# Patient Record
Sex: FEMALE | Race: WHITE | NOT HISPANIC OR LATINO | URBAN - METROPOLITAN AREA
[De-identification: names, ages, dates, MRNs, and addresses within clinical notes are randomized per-mention and may not be internally consistent; named-entity substitution may affect disease eponyms.]

---

## 2022-08-15 ENCOUNTER — OFFICE VISIT (OUTPATIENT)
Dept: PLASTIC SURGERY | Facility: CLINIC | Age: 57
End: 2022-08-15
Payer: COMMERCIAL

## 2022-08-15 VITALS
HEART RATE: 67 BPM | WEIGHT: 170 LBS | BODY MASS INDEX: 26.68 KG/M2 | HEIGHT: 67 IN | DIASTOLIC BLOOD PRESSURE: 84 MMHG | TEMPERATURE: 98 F | SYSTOLIC BLOOD PRESSURE: 125 MMHG

## 2022-08-15 DIAGNOSIS — Z41.1 ENCOUNTER FOR COSMETIC SURGERY: Primary | ICD-10-CM

## 2022-08-15 PROCEDURE — 3074F SYST BP LT 130 MM HG: CPT | Mod: CPTII,S$GLB,, | Performed by: SURGERY

## 2022-08-15 PROCEDURE — 3008F BODY MASS INDEX DOCD: CPT | Mod: CPTII,S$GLB,, | Performed by: SURGERY

## 2022-08-15 PROCEDURE — 99499 NO LOS: ICD-10-PCS | Mod: S$GLB,,, | Performed by: SURGERY

## 2022-08-15 PROCEDURE — 3074F PR MOST RECENT SYSTOLIC BLOOD PRESSURE < 130 MM HG: ICD-10-PCS | Mod: CPTII,S$GLB,, | Performed by: SURGERY

## 2022-08-15 PROCEDURE — 3079F DIAST BP 80-89 MM HG: CPT | Mod: CPTII,S$GLB,, | Performed by: SURGERY

## 2022-08-15 PROCEDURE — 99499 UNLISTED E&M SERVICE: CPT | Mod: S$GLB,,, | Performed by: SURGERY

## 2022-08-15 PROCEDURE — 3008F PR BODY MASS INDEX (BMI) DOCUMENTED: ICD-10-PCS | Mod: CPTII,S$GLB,, | Performed by: SURGERY

## 2022-08-15 PROCEDURE — 3079F PR MOST RECENT DIASTOLIC BLOOD PRESSURE 80-89 MM HG: ICD-10-PCS | Mod: CPTII,S$GLB,, | Performed by: SURGERY

## 2022-08-15 RX ORDER — LEVOTHYROXINE SODIUM 50 UG/1
50 TABLET ORAL EVERY MORNING
COMMUNITY
Start: 2022-08-02

## 2022-08-15 RX ORDER — FOLIC ACID 1 MG/1
1000 TABLET ORAL DAILY
COMMUNITY
Start: 2022-08-02

## 2022-08-15 NOTE — PROGRESS NOTES
"    CONSULTATION NOTE      8/15/2022    Cosmetic Surgery- Bracoiplasty, Skin removal and breast implant augmentation?    Referring Provider  Self   ?  HPI  Nikki Laurent is a 57 y.o. yo female who is here for evaluation for cosmetic surgery.     Heaviest weight  248lbs  Weight loss last 6 mo  78 lbs  Body mass index is Body mass index is 26.63 kg/m².     No plans for future pregnancy.      Bra Size  A cup, desires B-C cup    Smoking history: no    No bleeding or clotting issues.- no    Prior surgery to abdomen or breast: breast reduction/ gall bladder/appendectomy    No personal or family history of breast cancer in first degree relative. - no    Accutane use -no    Occupation -       [employer]  Past Medical History:   Diagnosis Date    H/O total hysterectomy     S/P bilateral breast reduction          PSH  Past Surgical History:   Procedure Laterality Date    APPENDECTOMY      GALLBLADDER SURGERY      REDUCTION OF BOTH BREASTS      TOTAL ABDOMINAL HYSTERECTOMY           FHX  History reviewed. No pertinent family history.       MEDICATIONS  Current Outpatient Medications   Medication Instructions    folic acid (FOLVITE) 1,000 mcg, Oral, Daily    levothyroxine (SYNTHROID) 50 mcg, Oral, Every morning         ALLERGIES   Review of patient's allergies indicates:  No Known Allergies      Social History  Social History     Socioeconomic History    Marital status: Unknown   Tobacco Use    Smoking status: Never Smoker    Smokeless tobacco: Never Used   Substance and Sexual Activity    Alcohol use: Never    Drug use: Never    Sexual activity: Yes           ROS  Negative per HPI.       Physical Exam  /84   Pulse 67   Temp 98.3 °F (36.8 °C)   Ht 5' 7" (1.702 m)   Wt 77.1 kg (170 lb)   BMI 26.63 kg/m²   Constitutional: Pt is oriented to person, place, and time.  Pt appears well-developed and well-nourished.   HENT: Normocephalic and atraumatic.   Pulmonary/Chest: Effort normal. No " respiratory distress.   Abdomen: Soft. Non-tender. No masses or distension.  Musculoskeletal: Normal range of motion. Pt exhibits no edema or deformity.   Neurological: Pt is alert and oriented to person, place, and time. No sensory deficit. Exhibits normal muscle tone.   Skin: Skin is warm. No rash noted. No erythema.     No ptosis no mass or skin change, no nipple discharge 350g breast  Periareolar scar and imf scar healed  Abdominal and lumbar lipodystrophy  Rectus diastasis  Skin pinch 3cm  No incisions  No hernias appreciated  Mons pubis with some descent        Plan  Tummy tuck with breast implant augmentation?  Stage back bra tuck with lipo and thigh skin removal  Obtain records on breast reduction  Check mammogram

## 2022-08-16 ENCOUNTER — TELEPHONE (OUTPATIENT)
Dept: PLASTIC SURGERY | Facility: CLINIC | Age: 57
End: 2022-08-16
Payer: COMMERCIAL

## 2022-08-16 NOTE — TELEPHONE ENCOUNTER
----- Message from Michelle Butcher sent at 8/16/2022 10:54 AM CDT -----  pt waiting on exam cost breakdown to be sent to [facility], discussed yesterday..[phone]

## 2022-08-16 NOTE — TELEPHONE ENCOUNTER
LVM explaining to pt that we would send the quotes via email and it could be towards the end of this week beginning of next week depending on when Dr Esposito signed her chart and sent to [facility].

## 2022-12-29 ENCOUNTER — OFFICE VISIT (OUTPATIENT)
Dept: UROLOGY | Facility: CLINIC | Age: 57
End: 2022-12-29
Payer: COMMERCIAL

## 2022-12-29 ENCOUNTER — HOSPITAL ENCOUNTER (OUTPATIENT)
Dept: RADIOLOGY | Facility: CLINIC | Age: 57
Discharge: HOME | End: 2022-12-29
Attending: NURSE PRACTITIONER
Payer: COMMERCIAL

## 2022-12-29 VITALS — RESPIRATION RATE: 18 BRPM | BODY MASS INDEX: 26.68 KG/M2 | WEIGHT: 170 LBS | HEIGHT: 67 IN

## 2022-12-29 DIAGNOSIS — R10.9 RIGHT FLANK PAIN: ICD-10-CM

## 2022-12-29 DIAGNOSIS — N39.498 OTHER URINARY INCONTINENCE: ICD-10-CM

## 2022-12-29 DIAGNOSIS — Z87.442 HISTORY OF NEPHROLITHIASIS: Primary | ICD-10-CM

## 2022-12-29 DIAGNOSIS — Z87.442 HISTORY OF NEPHROLITHIASIS: ICD-10-CM

## 2022-12-29 PROCEDURE — 1159F MED LIST DOCD IN RCRD: CPT | Mod: CPTII,S$GLB,, | Performed by: NURSE PRACTITIONER

## 2022-12-29 PROCEDURE — 3008F PR BODY MASS INDEX (BMI) DOCUMENTED: ICD-10-PCS | Mod: CPTII,S$GLB,, | Performed by: NURSE PRACTITIONER

## 2022-12-29 PROCEDURE — 99204 PR OFFICE/OUTPT VISIT, NEW, LEVL IV, 45-59 MIN: ICD-10-PCS | Mod: S$GLB,,, | Performed by: NURSE PRACTITIONER

## 2022-12-29 PROCEDURE — 74018 RADEX ABDOMEN 1 VIEW: CPT | Mod: 26,,, | Performed by: STUDENT IN AN ORGANIZED HEALTH CARE EDUCATION/TRAINING PROGRAM

## 2022-12-29 PROCEDURE — 74018 RADEX ABDOMEN 1 VIEW: CPT | Mod: TC,,, | Performed by: UROLOGY

## 2022-12-29 PROCEDURE — 99204 OFFICE O/P NEW MOD 45 MIN: CPT | Mod: S$GLB,,, | Performed by: NURSE PRACTITIONER

## 2022-12-29 PROCEDURE — 3008F BODY MASS INDEX DOCD: CPT | Mod: CPTII,S$GLB,, | Performed by: NURSE PRACTITIONER

## 2022-12-29 PROCEDURE — 1160F RVW MEDS BY RX/DR IN RCRD: CPT | Mod: CPTII,S$GLB,, | Performed by: NURSE PRACTITIONER

## 2022-12-29 PROCEDURE — 74018 XR ABDOMEN AP 1 VIEW: ICD-10-PCS | Mod: 26,,, | Performed by: STUDENT IN AN ORGANIZED HEALTH CARE EDUCATION/TRAINING PROGRAM

## 2022-12-29 PROCEDURE — 1159F PR MEDICATION LIST DOCUMENTED IN MEDICAL RECORD: ICD-10-PCS | Mod: CPTII,S$GLB,, | Performed by: NURSE PRACTITIONER

## 2022-12-29 PROCEDURE — 1160F PR REVIEW ALL MEDS BY PRESCRIBER/CLIN PHARMACIST DOCUMENTED: ICD-10-PCS | Mod: CPTII,S$GLB,, | Performed by: NURSE PRACTITIONER

## 2022-12-29 PROCEDURE — 74018 XR ABDOMEN AP 1 VIEW: ICD-10-PCS | Mod: TC,,, | Performed by: UROLOGY

## 2022-12-29 NOTE — PROGRESS NOTES
Subjective:       Patient ID: Nikki Laurent is a 57 y.o. female.    Chief Complaint: New Pt, Urinary Issues, Tarlov Cyst, Difficulty Emptying Bladder, R Flank Pain, Urinary Urgency, and Urinary Incontinence (Slight, intermittent / with nocturnal enuresis)      HPI: 57-year-old female new to the service seen today for complaints of symptoms of overactive bladder.  She has incontinence with frequency urgency and also reports some stress incontinence as well.  She was diagnosed with a Tarlov sacral cyst.  She also has degenerated disc L4-L5 L5-S1.  She is under the care of a specialist in the [facility] with surgery pending she states he is told her that her bladder issues are secondary to her degenerative spine disease as well as her Tarlov cyst.  She is here for 2nd opinion on that.  She does have a history of kidney stones which were surgically removed year ago by another urologist across [facility].  Today she states she has some right flank pain unsure if it is a stone or just residual from the previous ureteral stent that took place after the stone extraction year ago.  She denies any obvious blood in the urine or pain with urination at this time.  She does report some difficulty emptying the bladder state he many times she has to lean forward while seated to completely evacuate the bladder.  Patient does report vaginal dryness.  She is  2 para 2 both vaginal deliveries.  Total abdominal hysterectomy years back.  Recently abdominal sleeving 1 year ago has lost 79 lb since.       Past Medical History:   Past Medical History:   Diagnosis Date    H/O total hysterectomy     Renal stones     S/P bilateral breast reduction        Past Surgical Historical:   Past Surgical History:   Procedure Laterality Date    APPENDECTOMY      GALLBLADDER SURGERY      Gastric Sleeve  2021    REDUCTION OF BOTH BREASTS      TOTAL ABDOMINAL HYSTERECTOMY      URETEROLITHOTOMY Right     Right - with stent placement //  or Feb  2021        Medications:   Medication List with Changes/Refills   Current Medications    FOLIC ACID (FOLVITE) 1 MG TABLET    Take 1,000 mcg by mouth once daily.    LEVOTHYROXINE (SYNTHROID) 50 MCG TABLET    Take 50 mcg by mouth every morning.        Past Social History:   Social History     Socioeconomic History    Marital status:    Tobacco Use    Smoking status: Never    Smokeless tobacco: Never   Substance and Sexual Activity    Alcohol use: Never    Drug use: Never    Sexual activity: Yes       Allergies: Review of patient's allergies indicates:  No Known Allergies     Family History: History reviewed. No pertinent family history.     Review of Systems:  Review of Systems   Constitutional:  Negative for activity change and appetite change.   HENT:  Negative for congestion and dental problem.    Respiratory:  Negative for chest tightness and shortness of breath.    Cardiovascular:  Negative for chest pain.   Gastrointestinal:  Negative for abdominal distention and abdominal pain.   Genitourinary:  Positive for difficulty urinating, enuresis, frequency and urgency. Negative for decreased urine volume, dyspareunia, dysuria, flank pain, genital sores, hematuria and pelvic pain.   Musculoskeletal:  Negative for back pain and neck pain.   Allergic/Immunologic: Negative for immunocompromised state.   Neurological:  Negative for dizziness.   Hematological:  Negative for adenopathy.   Psychiatric/Behavioral:  Negative for agitation, behavioral problems and confusion.      Physical Exam:  Physical Exam  Constitutional:       Appearance: She is well-developed.   HENT:      Head: Normocephalic and atraumatic.   Eyes:      General: No scleral icterus.  Pulmonary:      Effort: Pulmonary effort is normal.      Breath sounds: Normal breath sounds.   Abdominal:      General: There is no distension.      Palpations: Abdomen is soft.      Tenderness: There is no abdominal tenderness.   Genitourinary:     Exam position:  Supine.      Labia:         Right: No rash, tenderness or lesion.         Left: No rash, tenderness or lesion.       Vagina: Normal.      Rectum: Normal.   Musculoskeletal:      Cervical back: Normal range of motion.   Skin:     General: Skin is warm and dry.   Neurological:      Mental Status: She is alert and oriented to person, place, and time.       Assessment/Plan:   Mixed incontinence--urinalysis negative for UTI today.  PVR 0 mL.  Trial  Myrbetriq 50 mg, 1 daily.  One month sample given patient will call me in a week if she is not having any successful make adjustments of via telephone at that time.  In reference to her stress incontinence we are going to set her up for cysto with a pelvic exam in the care of Dr. Amaya    Right flank pain--history kidney stones, KUB no obvious stones are seen over read by radiologist concurs.  Suspect may be referred pain from her degenerative spine disease.  She will follow-up with her PCP  Problem List Items Addressed This Visit    None

## 2023-04-17 ENCOUNTER — TELEPHONE (OUTPATIENT)
Dept: UROLOGY | Facility: CLINIC | Age: 58
End: 2023-04-17
Payer: COMMERCIAL

## 2023-04-17 NOTE — TELEPHONE ENCOUNTER
No answer, left message for arrival time of 2:40 PM. Instructed to call with any questions or concerns.

## 2023-06-19 ENCOUNTER — TELEPHONE (OUTPATIENT)
Dept: UROLOGY | Facility: CLINIC | Age: 58
End: 2023-06-19
Payer: COMMERCIAL

## 2023-06-20 ENCOUNTER — PROCEDURE VISIT (OUTPATIENT)
Dept: UROLOGY | Facility: CLINIC | Age: 58
End: 2023-06-20
Payer: COMMERCIAL

## 2023-06-20 VITALS
RESPIRATION RATE: 16 BRPM | BODY MASS INDEX: 27.41 KG/M2 | OXYGEN SATURATION: 99 % | DIASTOLIC BLOOD PRESSURE: 84 MMHG | WEIGHT: 175 LBS | HEART RATE: 72 BPM | SYSTOLIC BLOOD PRESSURE: 132 MMHG

## 2023-06-20 DIAGNOSIS — R30.0 DYSURIA: Primary | ICD-10-CM

## 2023-06-20 PROCEDURE — 52000 CYSTOSCOPY: ICD-10-PCS | Mod: S$GLB,,, | Performed by: UROLOGY

## 2023-06-20 PROCEDURE — 52000 CYSTOURETHROSCOPY: CPT | Mod: S$GLB,,, | Performed by: UROLOGY

## 2023-06-20 NOTE — PROCEDURES
Cystoscopy    Date/Time: 6/20/2023 11:00 AM  Performed by: Jeffery Amaya MD  Authorized by: Jeffery Amaya MD     Consent Done?:  Yes (Written)  Timeout: prior to procedure the correct patient, procedure, and site was verified    Prep: patient was prepped and draped in usual sterile fashion    Anesthesia:  Intraurethral instillation  Indications: hematuria    Position:  Supine  Anesthesia:  Intraurethral instillation  Patient sedated?: No    Preparation: Patient was prepped and draped in usual sterile fashion    Scope type:  Flexible cystoscope  External exam normal: Yes    Urethra normal: Yes    Comments:      The patient was brought to the procedure room placed on the table padded prepped and draped in usual sterile fashion in supine position. The cystoscope was inserted into the urethra and advanced the urethra was normal. The bladder was entered and inspected, it was found to be free of tumor stone or foreign body.  Bilateral ureteral orifices were identified and noted to be normal in appearance with clear efflux of urine at this point the scope was removed the patient tolerated the procedure well there were no complications.

## 2023-06-20 NOTE — PATIENT INSTRUCTIONS
Patient Education       Cystoscopy Discharge Instructions   About this topic   Your kidneys make urine. It is stored in your bladder. The urethra is a tube at the bottom of the bladder. Urine flows out of this tube. Sometimes, there is a blockage and urine is not able to leave the body.  A cystoscopy is a procedure that lets the doctor see the inside of your bladder and urethra. The doctor does it to:  Look for stones or tumors blocking the bladder and urethra  Look for changes or injury inside the bladder  Take a tissue sample from the inside of your bladder  Look for reasons for blood in the urine, pain with urination, or why you are passing urine often  Look for prostate problems     What care is needed at home?   Ask your doctor what you need to do when you go home. Make sure you ask questions if you do not understand what the doctor says. This way you will know what you need to do.  Take a warm bath or use a warm wet washcloth over the opening to the urethra. This will help to ease any pain. Do this as needed.  Drink 6 to 8 glasses of water a day and 3 to 4 glasses in the first few hours after the procedure to flush out your bladder and reduce irritation.  You may see some blood in your urine for a few days. This is normal.  Empty your bladder as soon as you feel the need to. Don't delay going to the bathroom. It stretches and weakens the bladder.  What follow-up care is needed?   Your doctor may ask you to make visits to the office to check on your progress. Be sure to keep these visits.  If you had a biopsy, talk with your doctor about the results.  What drugs may be needed?   The doctor may order drugs to:  Help with pain  Fight an infection  Help with bladder spasms  Will physical activity be limited?   Talk to your doctor about when you may go back to your normal activities like work, driving, or sex.  What problems could happen?   Bleeding  Infection  Injury to the bladder and urethra  Discomfort in the  urethra area  Burning sensation for a short time  Upset stomach  When do I need to call the doctor?   Signs of infection. These include a fever of 100.4°F (38°C) or higher, chills, pain with passing urine.  Pain that does not go away even with drugs or that lasts longer than 2 days  Too much blood in your urine  Passing large dime-sized clots  Cloudy urine  Little or no urine or not able to pass urine  Abdominal pain and nausea  Teach Back: Helping You Understand   The Teach Back Method helps you understand the information we are giving you. After you talk with the staff, tell them in your own words what you learned. This helps to make sure the staff has described each thing clearly. It also helps to explain things that may have been confusing. Before going home, make sure you can do these:  I can tell you about my procedure.  I can tell you what may help ease my pain.  I can tell you what I will do if I have a fever, chills, or am not able to pass urine.  Where can I learn more?   American Cancer Society  https://www.cancer.org/treatment/understanding-your-diagnosis/tests/endoscopy/cystoscopy.html   [employer]  https://www.cancerresearchuk.org/about-cancer/bladder-cancer/getting-diagnosed/tests-diagnose/cystoscopy   NHS Choices  http://www.nhs.uk/conditions/Cystoscopy/Pages/Introduction.aspx   Last Reviewed Date   2021-04-22  Consumer Information Use and Disclaimer   This information is not specific medical advice and does not replace information you receive from your health care provider. This is only a brief summary of general information. It does NOT include all information about conditions, illnesses, injuries, tests, procedures, treatments, therapies, discharge instructions or life-style choices that may apply to you. You must talk with your health care provider for complete information about your health and treatment options. This information should not be used to decide whether or not to accept your  health care providers advice, instructions or recommendations. Only your health care provider has the knowledge and training to provide advice that is right for you.  Copyright   Copyright © 2021 [employer] and its affiliates and/or licensors. All rights reserved.

## 2024-12-10 ENCOUNTER — TELEPHONE (OUTPATIENT)
Dept: GASTROENTEROLOGY | Facility: CLINIC | Age: 59
End: 2024-12-10
Payer: COMMERCIAL

## 2024-12-10 NOTE — TELEPHONE ENCOUNTER
Put on Epic procedure schedule for colon on Thursday 1/16/2025 to reserve slot. I will need all of her records regarding her most recent issues.  If she can pick them up and bring them to her OV with us that would be best.  Add on to [facility] clinic 12/17/2024 Tuesday.  CALIXTO

## 2024-12-10 NOTE — TELEPHONE ENCOUNTER
----- Message from Rossana sent at 12/10/2024 11:42 AM CST -----  Contact: self  Type: Caller is Requesting to Schedule Colonoscopy     Who Called: Nikki Laurent  Best Call Back Number: [phone]  Patient's Provider: tyrell  Date of Last Colonoscopy: 2021  Additional Information: n/a

## 2024-12-10 NOTE — TELEPHONE ENCOUNTER
Staff return pt call she stated she have an obstruction in the bile to the colon and need to be seen and scheduled for colonoscopy..   pt had xray yesterday at the [facility] center in [place]....   GINETTE    Faxed Cover sheet w/req to send most recent x-ray report to [facility] on [address]  [phone]... GINETTE

## 2024-12-11 NOTE — TELEPHONE ENCOUNTER
Staff return pt call.. she will come to appt 12/17/24 at 2:40pm and she's ok w/the procedure date of 1-16-25.   GINETTE

## 2024-12-11 NOTE — TELEPHONE ENCOUNTER
Staff attempted to call pt.. LMOVM to call ofc.. staff will add pt to [facility] schedule and the procedure  schedule to hold the spot... staff will also send req for all records from visit just in case pt can't get them.... GINETTE    Staff called [facility]  Wilson Levin, NP spoke to Malou.. she advsied pt was seen Dec. 9.. staff went req for records...  [phone]....   GINETTE

## 2024-12-11 NOTE — TELEPHONE ENCOUNTER
----- Message from Aliyah sent at 12/11/2024 10:02 AM CST -----  Regarding: Return Call  Contact: Patient  Type:  Patient Returning Call    Who Called:Nikki   Who Left Message for Patient: Carmelita or Ann-Marie  Does the patient know what this is regarding?:an appointment on 12/17/2024  Would the patient rather a call back or a response via [employer]? Call   Best Call Back Number: [phone] (home)     Additional Information: The caller is accepting the date of service and what is the time for the procedure    ThanksANSLEY

## 2024-12-16 NOTE — PROGRESS NOTES
Clinic Note    Reason for visit:  The primary encounter diagnosis was Epigastric pain. Diagnoses of Gastroesophageal reflux disease, unspecified whether esophagitis present, History of colon polyps, and Family history of colon cancer were also pertinent to this visit.    PCP: Wilson Levin       HPI:  This is a 59 y.o. female who is established. She is scheduled for a colonoscopy 1/16/2025. She has been having worsening constipation and had abdominal Xray concerning for worsening constipation. She is taking stool softener QOD and linzess prn. She is will have a BM when taking linzess but is small BM. She is having worsening bloating, early satiety. States her reflux has worsened over past 3 months. Takes omeprazole which helps. On tirzepatide and took her first dose this past week.     She did have Tarlov cyst surgery last year and was having incontinence episodes prior to this surgery. Has not had any issues since tarlov cyst surgery.    ABD Xray 12/9/2024 mild colonic stool burden. Operative change of upper abdomen    Colonoscopy 9/16/2021 6 TA, 1 hyp, repeat colon in 3y.     Review of Systems   Constitutional:  Negative for fatigue, fever and unexpected weight change.   HENT:  Negative for mouth sores, postnasal drip, sore throat and trouble swallowing.    Eyes:  Negative for pain, discharge and eye dryness.   Respiratory:  Negative for apnea, cough, choking, chest tightness, shortness of breath and wheezing.    Cardiovascular:  Negative for chest pain, palpitations and leg swelling.   Gastrointestinal:  Positive for abdominal pain, constipation, diarrhea, nausea and reflux. Negative for abdominal distention, anal bleeding, blood in stool, change in bowel habit, rectal pain, vomiting and fecal incontinence.   Genitourinary:  Negative for bladder incontinence, dysuria and hematuria.   Musculoskeletal:  Negative for arthralgias, back pain and joint swelling.   Integumentary:  Negative for color change and rash.    Allergic/Immunologic: Negative for environmental allergies and food allergies.   Neurological:  Negative for seizures and headaches.   Hematological:  Negative for adenopathy. Does not bruise/bleed easily.        Past Medical History:   Diagnosis Date    H/O gastric sleeve 2021    H/O total hysterectomy     Renal stones     S/P bilateral breast reduction      Past Surgical History:   Procedure Laterality Date    APPENDECTOMY      CYST REMOVAL      GALLBLADDER SURGERY      Gastric Sleeve  12/2021    REDUCTION OF BOTH BREASTS      TOTAL ABDOMINAL HYSTERECTOMY      URETEROLITHOTOMY Right 2021    Right - with stent placement // Jan or Feb 2021     Family History   Problem Relation Name Age of Onset    Colon cancer Mother  58        stage 4    Atrial fibrillation Mother      Atrial fibrillation Father      Heart failure Father       Social History     Tobacco Use    Smoking status: Never    Smokeless tobacco: Never   Substance Use Topics    Alcohol use: Never    Drug use: Never     Review of patient's allergies indicates:   Allergen Reactions    Diphenhydramine hcl      Adhesives in Band aids caused rash if left on for too long      Medication List with Changes/Refills   New Medications    SOD SULF-POT CHLORIDE-MAG SULF (SUTAB) 1.479-0.188- 0.225 GRAM TABLET    Take according to package instructions with indicated amount of water. No breakfast day before test. May substitute with Suprep, Clenpiq, Plenvu, Moviprep or GoLytely based on Rx plan and patient preference.   Current Medications    LINZESS 145 MCG CAP CAPSULE    Take 145 mcg by mouth daily as needed.    TIRZEPATIDE, WEIGHT LOSS, 15 MG/0.5 ML PNIJ    Inject 15 mg into the skin every 7 days.   Discontinued Medications    FOLIC ACID (FOLVITE) 1 MG TABLET    Take 1,000 mcg by mouth once daily.    LEVOTHYROXINE (SYNTHROID) 50 MCG TABLET    Take 50 mcg by mouth every morning.         Vital Signs:  /70 (BP Location: Left arm, Patient Position: Sitting)   Pulse  "83   Ht 5' 7" (1.702 m)   Wt 78.6 kg (173 lb 3.2 oz)   SpO2 97%   BMI 27.13 kg/m²        Physical Exam  Vitals reviewed.   Constitutional:       General: She is awake. She is not in acute distress.     Appearance: Normal appearance. She is well-developed. She is not ill-appearing, toxic-appearing or diaphoretic.   HENT:      Head: Normocephalic and atraumatic.      Nose: Nose normal.      Mouth/Throat:      Mouth: Mucous membranes are moist.      Pharynx: Oropharynx is clear. No oropharyngeal exudate or posterior oropharyngeal erythema.   Eyes:      General: Lids are normal. Gaze aligned appropriately. No scleral icterus.        Right eye: No discharge.         Left eye: No discharge.      Conjunctiva/sclera: Conjunctivae normal.   Neck:      Trachea: Trachea normal.   Cardiovascular:      Rate and Rhythm: Normal rate and regular rhythm.      Pulses:           Radial pulses are 2+ on the right side and 2+ on the left side.   Pulmonary:      Effort: Pulmonary effort is normal. No respiratory distress.      Breath sounds: No stridor. No wheezing.   Chest:      Chest wall: No tenderness.   Abdominal:      General: Bowel sounds are normal. There is no distension.      Palpations: Abdomen is soft. There is no fluid wave, hepatomegaly or mass.      Tenderness: There is abdominal tenderness in the right upper quadrant, epigastric area and left upper quadrant. There is no guarding or rebound.   Musculoskeletal:         General: No tenderness or deformity.      Cervical back: No tenderness.      Right lower leg: No edema.      Left lower leg: No edema.   Lymphadenopathy:      Cervical: No cervical adenopathy.   Skin:     General: Skin is warm and dry.      Capillary Refill: Capillary refill takes less than 2 seconds.      Coloration: Skin is not cyanotic, jaundiced or pale.   Neurological:      General: No focal deficit present.      Mental Status: She is alert and oriented to person, place, and time.      Motor: No " tremor.   Psychiatric:         Attention and Perception: Attention normal.         Mood and Affect: Mood and affect normal.         Speech: Speech normal.         Behavior: Behavior normal. Behavior is cooperative.            All of the data above and below has been reviewed by myself and any further interpretations will be reflected in the assessment and plan.   The data includes review of external notes, and independent interpretation of lab results, procedures, x-rays, and imaging reports.      Assessment:  Epigastric pain  -     Ambulatory Referral to External Surgery    Gastroesophageal reflux disease, unspecified whether esophagitis present  -     Ambulatory Referral to External Surgery    History of colon polyps  -     Ambulatory Referral to External Surgery  -     sod sulf-pot chloride-mag sulf (SUTAB) 1.479-0.188- 0.225 gram tablet; Take according to package instructions with indicated amount of water. No breakfast day before test. May substitute with Suprep, Clenpiq, Plenvu, Moviprep or GoLytely based on Rx plan and patient preference.  Dispense: 24 tablet; Refill: 0    Family history of colon cancer      Continue with colonoscopy as scheduled. Will add EGD due to worsening reflux/abdominal pain. Discussed to take Linzess every day rather than prn. Would also recommend Miralax titration.     Recommendations:    Schedule upper and lower endoscopy with Dr. Tony 1/16/2025.  Begin taking MiraLAX. Start with 1/2 capful daily, and titrate dose up or down until you are having daily, soft bowel movements.  Do not take tirzepatide the week before your procedure.  Take Linzess 145mcg daily.      Risks, benefits, and alternatives of medical management, any associated procedures, and/or treatment discussed with the patient. Patient given opportunity to ask questions and voices understanding. Patient has elected to proceed with the recommended care modalities as discussed.    Follow up if symptoms worsen or fail to  improve.    Order summary:  Orders Placed This Encounter   Procedures    Ambulatory Referral to External Surgery        Instructed patient to notify my office if they have not been contacted within two weeks after any procedures, submitting any samples (biopsies, blood, stool, urine, etc.) or after any imaging (X-ray, CT, MRI, etc.).      Rachelle Headley NP    This document may have been created using a voice recognition transcribing system. Incorrect words or phrases may have been missed during proofreading. Please interpret accordingly or contact me for clarification.

## 2024-12-17 ENCOUNTER — TELEPHONE (OUTPATIENT)
Dept: GASTROENTEROLOGY | Facility: CLINIC | Age: 59
End: 2024-12-17

## 2024-12-17 ENCOUNTER — OFFICE VISIT (OUTPATIENT)
Dept: GASTROENTEROLOGY | Facility: CLINIC | Age: 59
End: 2024-12-17
Payer: COMMERCIAL

## 2024-12-17 VITALS
BODY MASS INDEX: 27.18 KG/M2 | SYSTOLIC BLOOD PRESSURE: 101 MMHG | OXYGEN SATURATION: 97 % | WEIGHT: 173.19 LBS | HEIGHT: 67 IN | DIASTOLIC BLOOD PRESSURE: 70 MMHG | HEART RATE: 83 BPM

## 2024-12-17 DIAGNOSIS — Z86.0100 HISTORY OF COLON POLYPS: ICD-10-CM

## 2024-12-17 DIAGNOSIS — K21.9 GASTROESOPHAGEAL REFLUX DISEASE, UNSPECIFIED WHETHER ESOPHAGITIS PRESENT: ICD-10-CM

## 2024-12-17 DIAGNOSIS — R10.13 EPIGASTRIC PAIN: Primary | ICD-10-CM

## 2024-12-17 DIAGNOSIS — Z80.0 FAMILY HISTORY OF COLON CANCER: ICD-10-CM

## 2024-12-17 PROCEDURE — 99204 OFFICE O/P NEW MOD 45 MIN: CPT | Mod: S$PBB,,, | Performed by: NURSE PRACTITIONER

## 2024-12-17 PROCEDURE — 1159F MED LIST DOCD IN RCRD: CPT | Mod: CPTII,,, | Performed by: NURSE PRACTITIONER

## 2024-12-17 PROCEDURE — 3074F SYST BP LT 130 MM HG: CPT | Mod: CPTII,,, | Performed by: NURSE PRACTITIONER

## 2024-12-17 PROCEDURE — 3008F BODY MASS INDEX DOCD: CPT | Mod: CPTII,,, | Performed by: NURSE PRACTITIONER

## 2024-12-17 PROCEDURE — 3078F DIAST BP <80 MM HG: CPT | Mod: CPTII,,, | Performed by: NURSE PRACTITIONER

## 2024-12-17 PROCEDURE — 1160F RVW MEDS BY RX/DR IN RCRD: CPT | Mod: CPTII,,, | Performed by: NURSE PRACTITIONER

## 2024-12-17 RX ORDER — LINACLOTIDE 145 UG/1
145 CAPSULE, GELATIN COATED ORAL DAILY PRN
COMMUNITY

## 2024-12-17 RX ORDER — SOD SULF/POT CHLORIDE/MAG SULF 1.479 G
TABLET ORAL
Qty: 24 TABLET | Refills: 0 | Status: SHIPPED | OUTPATIENT
Start: 2024-12-17

## 2024-12-17 NOTE — TELEPHONE ENCOUNTER
Patient was given instructions and they were reviewed with patient. Patient was given sutab coupon. Patient was given AVS with apt details. Patient order was faxed to central scheduling at [facility]. No pa required. LRA 12/17/24 LRA

## 2025-01-08 ENCOUNTER — TELEPHONE (OUTPATIENT)
Dept: GASTROENTEROLOGY | Facility: CLINIC | Age: 60
End: 2025-01-08
Payer: COMMERCIAL

## 2025-01-08 DIAGNOSIS — Z86.0100 HISTORY OF COLON POLYPS: ICD-10-CM

## 2025-01-08 DIAGNOSIS — K21.9 GASTROESOPHAGEAL REFLUX DISEASE, UNSPECIFIED WHETHER ESOPHAGITIS PRESENT: ICD-10-CM

## 2025-01-08 DIAGNOSIS — R10.13 EPIGASTRIC PAIN: Primary | ICD-10-CM

## 2025-01-08 NOTE — TELEPHONE ENCOUNTER
S/w pt and told her that I was calling as a courtesy regarding up coming Colon with NBP on 1/16/25, Thursday and wanted to verify that she has her paper prep instructions and meds. Pt stated she still has her instructions, but still needs to  her meds. I reminded pt not to take Mounjaro 7 days before the procedure. Pt acknowledge she understood.  I also mentioned that [facility] will call the day before (Wed) with the arrival time, GI Lab is located on the third floor, and to pre-register before next Wednesday. ori

## 2025-01-08 NOTE — TELEPHONE ENCOUNTER
"[facility]  [address] [place] [address]  Phone: [phone]  Fax: [phone]    History & Physical         Provider: Dr. Rose Tony    Patient Name: Nikki GANT (age):1965  59 y.o.           Gender: female   Phone: [phone]     Referring Physician: Wilson Levin     Vital Signs:   Height - 5' 7"  Weight - 173 lb  BMI -  27.13    Plan: EGD w/ G/Dbx/Colonoscopy @ [facility]    Encounter Diagnoses   Name Primary?    Epigastric pain Yes    Gastroesophageal reflux disease, unspecified whether esophagitis present     History of colon polyps            History:      Past Medical History:   Diagnosis Date    H/O gastric sleeve     H/O total hysterectomy     Renal stones     S/P bilateral breast reduction       Past Surgical History:   Procedure Laterality Date    APPENDECTOMY      CYST REMOVAL      GALLBLADDER SURGERY      Gastric Sleeve  2021    REDUCTION OF BOTH BREASTS      TOTAL ABDOMINAL HYSTERECTOMY      URETEROLITHOTOMY Right     Right - with stent placement //  or 2021      Medication List with Changes/Refills   Current Medications    LINZESS 145 MCG CAP CAPSULE    Take 145 mcg by mouth daily as needed.    SOD SULF-POT CHLORIDE-MAG SULF (SUTAB) 1.479-0.188- 0.225 GRAM TABLET    Take according to package instructions with indicated amount of water. No breakfast day before test. May substitute with Suprep, Clenpiq, Plenvu, Moviprep or GoLytely based on Rx plan and patient preference.    TIRZEPATIDE, WEIGHT LOSS, 15 MG/0.5 ML PNIJ    Inject 15 mg into the skin every 7 days.      Review of patient's allergies indicates:   Allergen Reactions    Diphenhydramine hcl      Adhesives in Band aids caused rash if left on for too long      Family History   Problem Relation Name Age of Onset    Colon cancer Mother  58        stage 4    Atrial fibrillation Mother      Atrial " fibrillation Father      Heart failure Father        Social History     Tobacco Use    Smoking status: Never    Smokeless tobacco: Never   Substance Use Topics    Alcohol use: Never    Drug use: Never        Physical Examination:     General Appearance:___________________________  HEENT: _____________________________________  Abdomen:____________________________________  Heart:________________________________________  Lungs:_______________________________________  Extremities:___________________________________  Skin:_________________________________________  Endocrine:____________________________________  Genitourinary:_________________________________  Neurological:__________________________________      Patient has been evaluated immediately prior to sedation and is medically cleared for endoscopy with IVCS as an ASA class: ______      Physician Signature: _________________________       Date: ________  Time: ________

## 2025-01-16 ENCOUNTER — OUTSIDE PLACE OF SERVICE (OUTPATIENT)
Dept: GASTROENTEROLOGY | Facility: CLINIC | Age: 60
End: 2025-01-16

## 2025-01-16 LAB — CRC RECOMMENDATION EXT: NORMAL

## 2025-01-16 PROCEDURE — 45385 COLONOSCOPY W/LESION REMOVAL: CPT | Mod: 33,,, | Performed by: INTERNAL MEDICINE

## 2025-01-16 PROCEDURE — 43239 EGD BIOPSY SINGLE/MULTIPLE: CPT | Mod: 51,,, | Performed by: INTERNAL MEDICINE

## 2025-02-02 ENCOUNTER — TELEPHONE (OUTPATIENT)
Dept: GASTROENTEROLOGY | Facility: CLINIC | Age: 60
End: 2025-02-02
Payer: COMMERCIAL

## 2025-02-02 DIAGNOSIS — R10.12 LUQ ABDOMINAL PAIN: ICD-10-CM

## 2025-02-02 DIAGNOSIS — R10.13 EPIGASTRIC PAIN: Primary | ICD-10-CM

## 2025-02-03 NOTE — TELEPHONE ENCOUNTER
DBx nl, GBx wnl w/o Hp, 1 TA, repeat colonoscopy in 5 years. Had the LUQ pain prior to procedures (empty colon).    Notify patient. No signs of infection, precancerous cells or Celiac disease on the upper endoscopy biopsies.  Her colon polyp was benign. Repeat colonoscopy in 5 years. Confirm recall tab in appointment desk is up-to-date (update if needed). I rec a CT of the abd given her upper abdominal pain to confirm her panc is normal appearing. Order signed.  NBP

## 2025-02-03 NOTE — TELEPHONE ENCOUNTER
Called patient. No answer, left VM informing patient that I was calling with results from Colonoscopy and to give her more information of a scan that NBP has ordered. Asked patient to please return my call.      PA for CT Approved and already faxed to CS per Claire RUIZ,FALLON

## 2025-02-03 NOTE — TELEPHONE ENCOUNTER
Also, notify patient we will need to get a PA for the CT scan. Often insurance plans require an ultrasound beforehand. Has she had an ultrasound of her abdomen?  NBP

## 2025-02-04 NOTE — TELEPHONE ENCOUNTER
Spoke with patient and gave results, remarks and recommendation of repeat Colonoscopy in 5 years. Recall tab is up to date. Patient was informed and agreed to CT. She was informed that order was already sent to Central Scheduling and to give them a call in 3-5 days if she has not heard anything. Patient verbalized understanding of this information. -UMU,LPN

## 2025-03-11 ENCOUNTER — TELEPHONE (OUTPATIENT)
Dept: GASTROENTEROLOGY | Facility: CLINIC | Age: 60
End: 2025-03-11
Payer: COMMERCIAL

## 2025-03-17 ENCOUNTER — RESULTS FOLLOW-UP (OUTPATIENT)
Dept: GASTROENTEROLOGY | Facility: CLINIC | Age: 60
End: 2025-03-17

## 2025-03-17 DIAGNOSIS — R10.13 EPIGASTRIC PAIN: Primary | ICD-10-CM

## 2025-03-17 DIAGNOSIS — K21.9 GASTROESOPHAGEAL REFLUX DISEASE, UNSPECIFIED WHETHER ESOPHAGITIS PRESENT: ICD-10-CM

## 2025-03-17 DIAGNOSIS — R10.12 LUQ ABDOMINAL PAIN: ICD-10-CM

## 2025-03-18 NOTE — TELEPHONE ENCOUNTER
Justine Cantor Staff  Caller: Nikki (Today,  3:09 PM)  Type:  Test Results    Who Called: Nikki  Name of Test (Lab/Mammo/Etc): CT  Date of Test: 3/17/25  Ordering Provider:   Where the test was performed: [facility]  Would the patient rather a call back or a response via [facility]? Call Back  Best Call Back Number: [phone]  Additional Information:

## 2025-03-18 NOTE — TELEPHONE ENCOUNTER
Patient called to get results after being contacted to schedule the emptying test at hospital and was unsure on why she was having a gastric emptying test done. Relayed CT results per Dr. Tony notations, patient states she has not been taking the Linzess everyday, she has soft BM's every few days, and LUQ pain is about 2x per week.

## 2025-03-18 NOTE — TELEPHONE ENCOUNTER
3/17/2025 CT abd IV: no GB, panc nl, gastric sleeve anatomy.    Notify patient that her CT of the pancreas was normal.  I can order a stomach emptying test to see if her gastric sleeve is emptying properly as a potential source of her LUQ pain.  She reported the pain was present after her colonoscopy prep making me feel not related to her constipation but confirm she is having daily soft BMs with Linzess 145 daily and MiraLAX.  NBP

## 2025-04-04 ENCOUNTER — TELEPHONE (OUTPATIENT)
Dept: GASTROENTEROLOGY | Facility: CLINIC | Age: 60
End: 2025-04-04
Payer: COMMERCIAL

## 2025-04-04 NOTE — TELEPHONE ENCOUNTER
Patient called saying she had stomach emptying test on Tuesday and patient says it is in her chart. I told her I would check with the providers to see if they have looked at the results yet. -AB O

## 2025-04-04 NOTE — TELEPHONE ENCOUNTER
----- Message from Miriam sent at 4/3/2025  1:10 PM CDT -----  Contact: self  Type:  Patient Returning CallWho Called:Nikki Slade Left Message for Patient:unsureDoes the patient know what this is regarding?:test resultsWould the patient rather a call back or a response via [facility]? [facility] Call Back Number:[phone] Information: n/a

## 2025-04-07 ENCOUNTER — DOCUMENTATION ONLY (OUTPATIENT)
Dept: GASTROENTEROLOGY | Facility: CLINIC | Age: 60
End: 2025-04-07
Payer: COMMERCIAL

## 2025-04-13 ENCOUNTER — RESULTS FOLLOW-UP (OUTPATIENT)
Dept: GASTROENTEROLOGY | Facility: CLINIC | Age: 60
End: 2025-04-13

## 2025-04-13 NOTE — TELEPHONE ENCOUNTER
4/1/2025 GES nl.    Notify patient that her stomach emptying test is normal.  I rec taking her Linzess 145 daily with the goal of a soft complete BM every day.  We can also try Ibsrela samples if that does not help her GI Sx.  Make next available NP OV to review.  NBP

## 2025-04-15 NOTE — TELEPHONE ENCOUNTER
Spoke with patient. She was notified of results and voiced understanding. She said she's been having regular bowel movements since her colonoscopy. She does still have some pain but it isn't bad. Patient believes it is what she's eating and she may just have to watch what she eats. Patient declined OV since things are going well for her. Patient said she does still have Linzess. DMP